# Patient Record
Sex: MALE | Race: WHITE | HISPANIC OR LATINO | Employment: FULL TIME | URBAN - METROPOLITAN AREA
[De-identification: names, ages, dates, MRNs, and addresses within clinical notes are randomized per-mention and may not be internally consistent; named-entity substitution may affect disease eponyms.]

---

## 2019-03-23 ENCOUNTER — HOSPITAL ENCOUNTER (EMERGENCY)
Facility: HOSPITAL | Age: 29
Discharge: HOME | End: 2019-03-24
Attending: EMERGENCY MEDICINE

## 2019-03-23 VITALS
HEART RATE: 62 BPM | SYSTOLIC BLOOD PRESSURE: 175 MMHG | DIASTOLIC BLOOD PRESSURE: 74 MMHG | TEMPERATURE: 99 F | RESPIRATION RATE: 18 BRPM | OXYGEN SATURATION: 97 %

## 2019-03-23 DIAGNOSIS — S83.91XA SPRAIN OF RIGHT KNEE, UNSPECIFIED LIGAMENT, INITIAL ENCOUNTER: Primary | ICD-10-CM

## 2019-03-23 PROCEDURE — 99284 PR EMERGENCY DEPT VISIT,LEVEL IV: ICD-10-PCS | Mod: ,,, | Performed by: PHYSICIAN ASSISTANT

## 2019-03-23 PROCEDURE — 63600175 PHARM REV CODE 636 W HCPCS: Performed by: PHYSICIAN ASSISTANT

## 2019-03-23 PROCEDURE — 96372 THER/PROPH/DIAG INJ SC/IM: CPT

## 2019-03-23 PROCEDURE — 99284 EMERGENCY DEPT VISIT MOD MDM: CPT | Mod: 25

## 2019-03-23 PROCEDURE — 99284 EMERGENCY DEPT VISIT MOD MDM: CPT | Mod: ,,, | Performed by: PHYSICIAN ASSISTANT

## 2019-03-23 RX ORDER — KETOROLAC TROMETHAMINE 30 MG/ML
15 INJECTION, SOLUTION INTRAMUSCULAR; INTRAVENOUS
Status: COMPLETED | OUTPATIENT
Start: 2019-03-23 | End: 2019-03-23

## 2019-03-23 RX ORDER — NAPROXEN 500 MG/1
500 TABLET ORAL 2 TIMES DAILY WITH MEALS
Qty: 30 TABLET | Refills: 0 | Status: SHIPPED | OUTPATIENT
Start: 2019-03-23

## 2019-03-23 RX ADMIN — KETOROLAC TROMETHAMINE 15 MG: 30 INJECTION, SOLUTION INTRAMUSCULAR at 09:03

## 2019-03-24 NOTE — ED TRIAGE NOTES
29 year old presents to ED with complaints of right knee and bilateral heel pain. Pt denies numbness/tingling. Pt [id number].

## 2019-03-24 NOTE — DISCHARGE INSTRUCTIONS
Please schedule an appointment with your primary care doctor for follow-up.  Have attached a list of clinics where you can be seen if you do not already have a primary care doctor.  If you start to have any new or worsening symptoms, please come back to the emergency department.

## 2019-03-24 NOTE — ED PROVIDER NOTES
Encounter Date: 3/23/2019       History     Chief Complaint   Patient presents with    Knee Pain     reports that he jumped off of her 2nd floor balcony and landed on right knee, reports right knee pain, pt American speaking      29-year-old male with no known past medical history presents for bilateral heel pain and right knee pain after jumping off a 2nd story balcony.  Patient states that he wanted to get away from his wife, jumped off a balcony landed on his feet and subsequently had severe pain to the posterior aspect of his knee as well as bilateral heels.  Unable to ambulate after the fall.  Reports pain made worse with movement, denies any palliating factors, has not attempted any medications.  He denies numbness/tingling, weakness, back pain, neck pain, chest pain or shortness of breath. He denies any attempts at self-harm, suicidal ideations or homicidal ideations.  Patient is primarily [place]-speaking, however speaks some English.  Patient offered translation services, he declines.        Review of patient's allergies indicates:  No Known Allergies  History reviewed. No pertinent past medical history.  History reviewed. No pertinent surgical history.  History reviewed. No pertinent family history.  Social History     Tobacco Use    Smoking status: Not on file   Substance Use Topics    Alcohol use: Not on file    Drug use: Not on file     Review of Systems   Constitutional: Negative for fatigue and fever.   Eyes: Negative for visual disturbance.   Respiratory: Negative for cough and shortness of breath.    Cardiovascular: Negative for chest pain and leg swelling.   Gastrointestinal: Negative for abdominal pain, nausea and vomiting.   Genitourinary: Negative for dysuria and hematuria.   Musculoskeletal: Positive for arthralgias and gait problem. Negative for back pain, joint swelling, myalgias, neck pain and neck stiffness.   Skin: Negative for pallor and wound.   Neurological: Negative for weakness,  light-headedness, numbness and headaches.   Hematological: Negative for adenopathy. Does not bruise/bleed easily.       Physical Exam     Initial Vitals [03/23/19 2004]   BP Pulse Resp Temp SpO2   (!) 173/95 87 18 98.5 °F (36.9 °C) 97 %      MAP       --         Physical Exam    Nursing note and vitals reviewed.  Constitutional: He appears well-developed and well-nourished. He is not diaphoretic. No distress.   Patient on cell phone   HENT:   Head: Normocephalic and atraumatic.   Eyes: EOM are normal. Pupils are equal, round, and reactive to light.   Neck: Normal range of motion. Neck supple.   No posterior midline tenderness to C-spine.  No step-offs or bony abnormalities   Cardiovascular: Normal rate, regular rhythm, normal heart sounds and intact distal pulses.   Pulmonary/Chest: Breath sounds normal.   Musculoskeletal:        Right hip: Normal.        Left hip: Normal.        Right knee: He exhibits decreased range of motion. He exhibits no swelling, no effusion, no ecchymosis, no deformity, no laceration, no erythema, normal alignment, no LCL laxity, normal patellar mobility, no bony tenderness, normal meniscus and no MCL laxity. Tenderness found. No medial joint line, no lateral joint line, no MCL, no LCL and no patellar tendon tenderness noted.        Left knee: Normal.        Right ankle: Normal.        Left ankle: Normal.        Cervical back: Normal.        Thoracic back: Normal.        Lumbar back: Normal.        Right upper leg: Normal.        Left upper leg: Normal.        Right lower leg: Normal.        Left lower leg: Normal.        Right foot: Normal.        Left foot: Normal.   Tenderness to palpation of posterior aspect of the right knee.  No deformities, ecchymosis or edema. Patient has difficulty flexing the right knee.  Normal sensation   Neurological: He is alert and oriented to person, place, and time. He has normal strength. No sensory deficit.   Skin: Skin is warm and dry.   Psychiatric: He  has a normal mood and affect.         ED Course   Procedures  Labs Reviewed - No data to display       Imaging Results          X-Ray Ankle Complete Right (Final result)  Result time 03/23/19 22:10:00    Final result by Oli Back MD (03/23/19 22:10:00)                 Impression:      Moderate soft tissue edema.  No displaced fracture.      Electronically signed by: Oli Back MD  Date:    03/23/2019  Time:    22:10             Narrative:    EXAMINATION:  XR ANKLE COMPLETE 3 VIEW RIGHT    CLINICAL HISTORY:  Fall.    TECHNIQUE:  AP, lateral, and oblique images of the right ankle were performed.    COMPARISON:  None.    FINDINGS:  No acute fracture or dislocation.  Periosteal thickening noted involving the distal tibia.  Moderate symmetric soft tissue edema about the ankle.  No radiopaque foreign body.                               X-Ray Foot Complete Bilateral (Final result)  Result time 03/23/19 22:17:02   Procedure changed from X-Ray Foot Complete Left     Final result by Oli Back MD (03/23/19 22:17:02)                 Impression:      Soft tissue edema.  No displaced fracture.      Electronically signed by: Oli Back MD  Date:    03/23/2019  Time:    22:17             Narrative:    EXAMINATION:  XR FOOT COMPLETE 3 VIEW BILATERAL    CLINICAL HISTORY:  fall; Pain in right foot    TECHNIQUE:  AP, lateral, and oblique views of both feet were performed.    COMPARISON:  None    FINDINGS:  Suboptimal positioning of the right foot limits evaluation.  No evidence of acute fracture or dislocation.  Bilateral soft tissue edema.  No radiopaque foreign body.                               X-Ray Knee 1 or 2 View Bilateral (Final result)  Result time 03/23/19 22:12:28   Procedure changed from X-Ray Knee 1 or 2 View Left     Final result by Oli Back MD (03/23/19 22:12:28)                 Impression:      No displaced fracture.      Electronically signed by: Oli Back  MD  Date:    03/23/2019  Time:    22:12             Narrative:    EXAMINATION:  XR TIBIA FIBULA BILATERAL; XR KNEE 1 OR 2 VIEW BILATERAL    CLINICAL HISTORY:  fall;    TECHNIQUE:  Two views bilateral tibia/fibula and two views bilateral knee.    COMPARISON:  None.    FINDINGS:  Suboptimal positioning and cross-table lateral technique limits evaluation.  No displaced fracture identified.  Normal alignment.  Soft tissues are unremarkable.                               X-Ray Tibia Fibula Bilateral (Final result)  Result time 03/23/19 22:12:28   Procedure changed from X-Ray Tibia Fibula 2 View Right     Final result by Oli Back MD (03/23/19 22:12:28)                 Impression:      No displaced fracture.      Electronically signed by: Oli Back MD  Date:    03/23/2019  Time:    22:12             Narrative:    EXAMINATION:  XR TIBIA FIBULA BILATERAL; XR KNEE 1 OR 2 VIEW BILATERAL    CLINICAL HISTORY:  fall;    TECHNIQUE:  Two views bilateral tibia/fibula and two views bilateral knee.    COMPARISON:  None.    FINDINGS:  Suboptimal positioning and cross-table lateral technique limits evaluation.  No displaced fracture identified.  Normal alignment.  Soft tissues are unremarkable.                                 Medical Decision Making:   Clinical Tests:   Radiological Study: Ordered and Reviewed       APC / Resident Notes:   29-year-old male presenting for pain and decreased range of motion as well as bilateral heel pain after jumping off a 2 story balcony.  He has no obvious signs of trauma, decreased range of motion of the right knee is noted, however I suspect this exam is limited secondary to pain. Differential diagnosis includes knee sprain, calcaneal fractures, stress fracture of tib-fib. Will give Toradol, do x-rays given mechanism of action and reassess.    X-rays without any fractures or dislocation.  Patient reporting improvement of pain after Toradol.  He is able to flex the knee and ambulate.   Will place patient in knee splint for comfort and discharge with naproxen.    Patient was unwilling to wait for splint to come from orthopedic room.  Advised patient to R.I.C.E. the leg.  Stressed the importance of follow-up, strict ED return precautions given.  Patient voiced understanding and is comfortable with discharge. I discussed this patient with my supervising physician.    Jessica Interiano PA-C                     Clinical Impression:       ICD-10-CM ICD-9-CM   1. Sprain of right knee, unspecified ligament, initial encounter S83.91XA 844.9         Disposition:   Disposition: Discharged  Condition: Stable                        Jessica Interiano PA-C  03/25/19 0834

## 2023-12-01 ENCOUNTER — OCCUPATIONAL HEALTH (OUTPATIENT)
Dept: URGENT CARE | Facility: CLINIC | Age: 33
End: 2023-12-01

## 2023-12-01 DIAGNOSIS — Z02.83 ENCOUNTER FOR DRUG SCREENING: Primary | ICD-10-CM

## 2023-12-01 LAB
CTP QC/QA: YES
POC 10 PANEL DRUG SCREEN: NEGATIVE

## 2023-12-01 PROCEDURE — 80305 POCT RAPID DRUG SCREEN 10 PANEL: ICD-10-PCS | Mod: S$GLB,,, | Performed by: PHYSICIAN ASSISTANT

## 2023-12-01 PROCEDURE — 80305 DRUG TEST PRSMV DIR OPT OBS: CPT | Mod: S$GLB,,, | Performed by: PHYSICIAN ASSISTANT
